# Patient Record
Sex: FEMALE | Race: BLACK OR AFRICAN AMERICAN | HISPANIC OR LATINO
[De-identification: names, ages, dates, MRNs, and addresses within clinical notes are randomized per-mention and may not be internally consistent; named-entity substitution may affect disease eponyms.]

---

## 2021-05-07 ENCOUNTER — APPOINTMENT (OUTPATIENT)
Dept: RHEUMATOLOGY | Facility: CLINIC | Age: 41
End: 2021-05-07
Payer: MEDICAID

## 2021-05-07 VITALS
TEMPERATURE: 97.2 F | HEART RATE: 90 BPM | HEIGHT: 67 IN | DIASTOLIC BLOOD PRESSURE: 79 MMHG | OXYGEN SATURATION: 96 % | SYSTOLIC BLOOD PRESSURE: 121 MMHG | BODY MASS INDEX: 24.96 KG/M2 | WEIGHT: 159 LBS

## 2021-05-07 PROCEDURE — 99203 OFFICE O/P NEW LOW 30 MIN: CPT

## 2021-05-10 ENCOUNTER — NON-APPOINTMENT (OUTPATIENT)
Age: 41
End: 2021-05-10

## 2021-05-10 ENCOUNTER — APPOINTMENT (OUTPATIENT)
Dept: HEART AND VASCULAR | Facility: CLINIC | Age: 41
End: 2021-05-10
Payer: MEDICAID

## 2021-05-10 VITALS
HEIGHT: 67 IN | OXYGEN SATURATION: 98 % | DIASTOLIC BLOOD PRESSURE: 72 MMHG | WEIGHT: 160 LBS | HEART RATE: 83 BPM | SYSTOLIC BLOOD PRESSURE: 110 MMHG | BODY MASS INDEX: 25.11 KG/M2

## 2021-05-10 DIAGNOSIS — Z82.49 FAMILY HISTORY OF ISCHEMIC HEART DISEASE AND OTHER DISEASES OF THE CIRCULATORY SYSTEM: ICD-10-CM

## 2021-05-10 DIAGNOSIS — Z78.9 OTHER SPECIFIED HEALTH STATUS: ICD-10-CM

## 2021-05-10 PROCEDURE — 99203 OFFICE O/P NEW LOW 30 MIN: CPT

## 2021-05-10 PROCEDURE — 93000 ELECTROCARDIOGRAM COMPLETE: CPT

## 2021-05-10 RX ORDER — IRON/IRON ASP GLY/FA/MV-MIN 38 125-25-1MG
TABLET ORAL
Refills: 0 | Status: ACTIVE | COMMUNITY

## 2021-05-10 RX ORDER — MULTIVIT-MIN/IRON/FOLIC ACID/K 18-600-40
CAPSULE ORAL
Refills: 0 | Status: ACTIVE | COMMUNITY

## 2021-05-10 RX ORDER — UBIDECARENONE/VIT E ACET 100MG-5
CAPSULE ORAL
Refills: 0 | Status: ACTIVE | COMMUNITY

## 2021-05-10 RX ORDER — BACILLUS COAGULANS/INULIN 1B-250 MG
CAPSULE ORAL
Refills: 0 | Status: ACTIVE | COMMUNITY

## 2021-05-10 NOTE — REASON FOR VISIT
[FreeTextEntry1] : 40 yo woman with no past medical history who presents today with complaints of intermittent swelling to B/L hands (R> L) and feet when she wakes up. She also feels numbness to UE and pressure across her whole body but mostly from her waist down. Symptoms temporarily relieved by shower or bath. Denies  SOB/THURSTON, palpitations, COMFORT, dizziness and syncope. ETT: SOB with walking but states she walks for at least 12 minutes to 1 hour and THURSTON up to 2 flights of stairs.Diet: Clean eating mostly vegetarian. She does not sleep well 2/2 discomfort of numbness and pressure. Remarks a chest pain on occasion. She was seen in ER for it and a cardiac evaluation was requested\par

## 2021-05-10 NOTE — DISCUSSION/SUMMARY
[FreeTextEntry1] : CP, atypical EKG reviewed, will bring back for echocardiogram provided her insurance company feels that it is a reasonable course of action and deems appropriate to approve.\par

## 2021-05-18 NOTE — PHYSICAL EXAM
[General Appearance - Alert] : alert [General Appearance - In No Acute Distress] : in no acute distress [General Appearance - Well Nourished] : well nourished [General Appearance - Well Developed] : well developed [General Appearance - Well-Appearing] : healthy appearing [Sclera] : the sclera and conjunctiva were normal [Respiration, Rhythm And Depth] : normal respiratory rhythm and effort [Exaggerated Use Of Accessory Muscles For Inspiration] : no accessory muscle use [Edema] : there was no peripheral edema [Nail Clubbing] : no clubbing  or cyanosis of the fingernails [Abnormal Walk] : normal gait [Musculoskeletal - Swelling] : no joint swelling seen [Motor Tone] : muscle strength and tone were normal [] : no rash [Skin Lesions] : no skin lesions [Oriented To Time, Place, And Person] : oriented to person, place, and time [Impaired Insight] : insight and judgment were intact [Affect] : the affect was normal [FreeTextEntry1] : No active synovitis of the upper and lower extremities bilaterally. Feels some resistance with opening hands from handgrip with extension of the fingers.

## 2021-05-18 NOTE — HISTORY OF PRESENT ILLNESS
[FreeTextEntry1] : 41 year old woman referred for rheumatology evaluation\par Feels pinched nerves and stiffness of joints\par Feels symptoms comes and goes\par Symptoms present for at least for the past year\par \par Sometimes feels flexible, other times feels like under a pile of bricks\par \par No regular medications\par Takes vitamin D,C, iron, MVI and probiotic\par vitamins since 2020\par \par No history of covid, had covid test last week\par No vaccine to date\par \par Had MRI brain and cervical spine\par Monday will have follow up with neurology\par Also with cardiology evaluation pending as well. \par \par No symptoms at present, feeling well\par \par Exercises regularly since April 6 at least for 10 minutes to one hour\par But feels sometimes legs swell\par \par Numbness all over\par Will have blood tests on Monday, unclear what was ordered\par \par Went to the emergency at [facility], was seen because of the symptoms\par Swelling hands and feet was feeling pressure in the head, not feeling as though could stretch limbs due to the pressure\par \par Tightness in the hands, feeling like can't be flexible\par Feeling like cannot stretch\par Works as , so feels limiting her ability to work \par \par No rashes\par No alopecia\par No photosensitivity\par No Raynaud's \par No sicca symptoms

## 2021-05-18 NOTE — REVIEW OF SYSTEMS
[Negative] : Heme/Lymph [FreeTextEntry9] : sometimes feels stiffness of the hands, not present at this time

## 2021-05-18 NOTE — ASSESSMENT
[FreeTextEntry1] : 41 year old woman referred following emergency room visit.  Patient with feelings of hand and joint stiffness, not present at this time in addition to paresthesias.  Unclear etiology of symptoms at this time, will check SANDRA with reflex to serologies if positive, in addition to Rheumatoid factor and anti CCP ab. Patient will see cardiology as well in addition to neurology.  further managemnt pending lab results.

## 2021-05-20 ENCOUNTER — APPOINTMENT (OUTPATIENT)
Dept: PULMONOLOGY | Facility: CLINIC | Age: 41
End: 2021-05-20
Payer: MEDICAID

## 2021-05-20 VITALS
WEIGHT: 160 LBS | TEMPERATURE: 98.1 F | DIASTOLIC BLOOD PRESSURE: 78 MMHG | SYSTOLIC BLOOD PRESSURE: 115 MMHG | HEART RATE: 88 BPM | HEIGHT: 67 IN | BODY MASS INDEX: 25.11 KG/M2 | OXYGEN SATURATION: 98 %

## 2021-05-20 PROCEDURE — 99244 OFF/OP CNSLTJ NEW/EST MOD 40: CPT

## 2021-05-21 NOTE — CONSULT LETTER
[Dear  ___] : Dear  [unfilled], [Consult Letter:] : I had the pleasure of evaluating your patient, [unfilled]. [Please see my note below.] : Please see my note below. [Consult Closing:] : Thank you very much for allowing me to participate in the care of this patient.  If you have any questions, please do not hesitate to contact me. [FreeTextEntry3] : Sincerely\par \par Fausto Yang MD [facility]\par , [facility] [employer]\par Associate , Pulmonary and Critical Care Fellowship\par Pulmonary and Critical Care\par [facility]\par Phone: [phone]\par

## 2021-05-21 NOTE — REASON FOR VISIT
[Consultation] : a consultation [Shortness of Breath] : shortness of breath [TextBox_13] : Dr. Treadwell

## 2021-05-21 NOTE — REVIEW OF SYSTEMS
[Fever] : no fever [Chills] : no chills [Dry Eyes] : no dry eyes [Eye Irritation] : no eye irritation [Cough] : no cough [Sputum] : no sputum [Dyspnea] : dyspnea [SOB on Exertion] : sob on exertion [Chest Discomfort] : no chest discomfort [TextBox_151] : rest of ROS negative except in HPI

## 2021-05-21 NOTE — HISTORY OF PRESENT ILLNESS
[TextBox_4] : 41 year old female, never smoker is c/o shortness of breath and inability to take deep breath. Patient is c/o inability to take deep breath from nose. Patient's SOB is present , intermittently, at rest and with exertion. Denies cough, chest pain, pedal edema. Patient has seen cardiologist for current symptoms. Denies history of asthma.

## 2021-05-21 NOTE — DISCUSSION/SUMMARY
[FreeTextEntry1] : 41 year old female, never smoker is c/o shortness of breath and inability to take deep breath.\par \par Review:\par Cardiology note\par Rehumatology note\par \par Plan:\par Nasal turbinate hypertrophy may give patient feeling of inability to breath through nose. Flonase nasal spray was added. Patient has follow up with ENT.\par \par PFT with DLCO\par 6 minute walk test\par CXr ordered today\par Follow up on same day of PFT

## 2021-05-21 NOTE — PHYSICAL EXAM
[No Acute Distress] : no acute distress [Well Nourished] : well nourished [Normal Oropharynx] : normal oropharynx [Low Lying Soft Palate] : low lying soft palate [Erythema] : erythema [Turbinate hypertrophy] : turbinate hypertrophy [Normal Appearance] : normal appearance [No JVD] : no jvd [Normal Rate/Rhythm] : normal rate/rhythm [Normal S1, S2] : normal s1, s2 [No Resp Distress] : no resp distress [Clear to Auscultation Bilaterally] : clear to auscultation bilaterally [Benign] : benign [Not Tender] : not tender [Normal Gait] : normal gait [No Clubbing] : no clubbing [No Edema] : no edema [Normal Color/ Pigmentation] : normal color/ pigmentation [No Rash] : no rash [No Focal Deficits] : no focal deficits [No Sensory Deficits] : no sensory deficits [Oriented x3] : oriented x3 [Normal Affect] : normal affect

## 2021-05-27 ENCOUNTER — NON-APPOINTMENT (OUTPATIENT)
Age: 41
End: 2021-05-27

## 2021-06-01 ENCOUNTER — APPOINTMENT (OUTPATIENT)
Dept: OTOLARYNGOLOGY | Facility: CLINIC | Age: 41
End: 2021-06-01
Payer: MEDICAID

## 2021-06-01 VITALS
DIASTOLIC BLOOD PRESSURE: 78 MMHG | HEIGHT: 67 IN | HEART RATE: 83 BPM | SYSTOLIC BLOOD PRESSURE: 130 MMHG | TEMPERATURE: 97.8 F | OXYGEN SATURATION: 97 % | BODY MASS INDEX: 25.11 KG/M2 | WEIGHT: 160 LBS

## 2021-06-01 PROCEDURE — 31575 DIAGNOSTIC LARYNGOSCOPY: CPT

## 2021-06-01 PROCEDURE — 92557 COMPREHENSIVE HEARING TEST: CPT

## 2021-06-01 PROCEDURE — 92550 TYMPANOMETRY & REFLEX THRESH: CPT

## 2021-06-01 PROCEDURE — 92504 EAR MICROSCOPY EXAMINATION: CPT

## 2021-06-01 NOTE — CONSULT LETTER
[Dear  ___] : Dear  [unfilled], [Consult Letter:] : I had the pleasure of evaluating your patient, [unfilled]. [Please see my note below.] : Please see my note below. [Consult Closing:] : Thank you very much for allowing me to participate in the care of this patient.  If you have any questions, please do not hesitate to contact me. [Sincerely,] : Sincerely, [FreeTextEntry3] : SLY Rooney Jr, MD, FAAOHNS\par Otolaryngologist\par [facility]

## 2021-06-01 NOTE — HISTORY OF PRESENT ILLNESS
[de-identified] : ~ 1 yr of her nose, mouth and ears feel "up" and are "not in divine order" with a sense of fullness in the throat, difficulty breathing, and hearing is a struggle (but denies hearing loss- she has a hard time explaining this). She denies brianne dysphagia/sticking of food but has occasional pain w/ swallowing. She can't feel her throat but feels certain that something is wrong. No hoarseness or unexp. wt gain.  Her eyes & mouth feel dry. Denies reflux. \par Two small lesions are present on the R lower gums for an indeterminate period of time; she has an appt w/ her dentist regarding this. \par Denies tinnitus, ear pain or drainage. No FHx HL. Uses qtips. \par Her nose feels closed and she can't inhale deeply. (+) generalized dyspnea worst in the morning. Feels like her nose looks swollen. Hasn't tried meds for this & denies allergies. She hasn't picked up a rx for flonase per Dr. Yang.

## 2021-06-01 NOTE — PHYSICAL EXAM
[Binocular Microscopic Exam] : Binocular microscopic exam was performed [Normal] : the left middle ear was normal [de-identified] : large, pale ITs bilat [Laryngoscopy Performed] : laryngoscopy was performed, see procedure section for findings [de-identified] : two small R manidib. gingival papillomatous masses

## 2021-06-01 NOTE — ASSESSMENT
[FreeTextEntry1] : Reassured her regarding her hearing; discussed possible APD testing if this seems appropriate to her. \par \par Reviewed her throat complaints which are largely nonspecific; discussed warning signs. Will recheck the BOT at her next visit. \par \par RTC for excision of the two mucosal lesions which are likely to be papillomata. \par \par Encouraged her to try the fluticasone that was prescribed.

## 2021-06-01 NOTE — PROCEDURE
[de-identified] : We discussed the elevated risk of liberation of viral particles from the nasopharynx if the patient were to be asymptomatically infected with COVID-19; after weighing the risks & benefits the decision was made to proceed. The procedure was performed while wearing appropriate PPE and a camera attached to a video system was used to maximize separation from the patient. The scope was handled appropriately. \par Indication: requirement for exam not possible via anterior examination; multiple throat complaints\par After verbal consent and the administration of a small amount of an aerosolized phenylephrine/lidocaine mix, examination was performed with a flexible endoscope placed through the nose. Findings:\par Nasopharynx: unremarkable\par Soft palate, lateral and posterior pharyngeal walls: unremarkable\par Base of tongue & lingual tonsil: minimal minimal asymmetry (R larger)\par Vallecula: unremarkable\par Epiglottis: unremarkable\par Piriform sinuses and pharyngoesophageal junction: unremarkable\par Arytenoids and AE folds: mild interarytenoid edema\par Ventricle and false vocal folds: unremarkable\par True vocal folds: Smooth free edge; surface without ectasias or edema; normal movement bilaterally with good apposition in phonation\par Visible subglottis: unremarkable\par Other findings: none

## 2021-06-01 NOTE — REASON FOR VISIT
[Initial Consultation] : an initial consultation for [FreeTextEntry2] : difficulty breathing & swallowing

## 2021-06-07 ENCOUNTER — APPOINTMENT (OUTPATIENT)
Dept: HEART AND VASCULAR | Facility: CLINIC | Age: 41
End: 2021-06-07

## 2021-06-07 ENCOUNTER — APPOINTMENT (OUTPATIENT)
Dept: RADIOLOGY | Facility: CLINIC | Age: 41
End: 2021-06-07

## 2021-06-07 ENCOUNTER — OUTPATIENT (OUTPATIENT)
Dept: OUTPATIENT SERVICES | Facility: HOSPITAL | Age: 41
LOS: 1 days | End: 2021-06-07
Payer: MEDICAID

## 2021-06-07 ENCOUNTER — APPOINTMENT (OUTPATIENT)
Dept: PULMONOLOGY | Facility: CLINIC | Age: 41
End: 2021-06-07
Payer: MEDICAID

## 2021-06-07 ENCOUNTER — RESULT REVIEW (OUTPATIENT)
Age: 41
End: 2021-06-07

## 2021-06-07 VITALS
HEART RATE: 107 BPM | SYSTOLIC BLOOD PRESSURE: 115 MMHG | DIASTOLIC BLOOD PRESSURE: 68 MMHG | HEIGHT: 67 IN | WEIGHT: 165 LBS | BODY MASS INDEX: 25.9 KG/M2 | OXYGEN SATURATION: 100 % | TEMPERATURE: 97.6 F

## 2021-06-07 PROCEDURE — 71046 X-RAY EXAM CHEST 2 VIEWS: CPT | Mod: 26

## 2021-06-07 PROCEDURE — 94726 PLETHYSMOGRAPHY LUNG VOLUMES: CPT

## 2021-06-07 PROCEDURE — 94729 DIFFUSING CAPACITY: CPT

## 2021-06-07 PROCEDURE — 99214 OFFICE O/P EST MOD 30 MIN: CPT | Mod: 25

## 2021-06-07 PROCEDURE — 94618 PULMONARY STRESS TESTING: CPT

## 2021-06-07 PROCEDURE — ZZZZZ: CPT

## 2021-06-07 PROCEDURE — 94060 EVALUATION OF WHEEZING: CPT

## 2021-06-07 NOTE — HISTORY OF PRESENT ILLNESS
[TextBox_4] : 41 year old female, never smoker is c/o shortness of breath and inability to take deep breath. Patient is c/o inability to take deep breath from nose. Patient's SOB is present , intermittently, at rest and with exertion. Denies cough, chest pain, pedal edema. Patient has seen cardiologist for current symptoms. Denies history of asthma. \par \par 6/7/21:\par PFT was done. Patient did not get CXR and has not started Flonase nasal spray. Patient continues to feel SOB and chest tightness after waking up.

## 2021-06-07 NOTE — DISCUSSION/SUMMARY
[FreeTextEntry1] : 41 year old female, never smoker is c/o shortness of breath and inability to take deep breath.\par \par Review:\par PFT (6 / 21): FEV1 67 , FEV1/FVC 83  , TLC 67   , DLCO 59  , Rev 6 %\par Six minute walk test (6/21): 97% on RA at rest, increased to 100% on RA at rest\par Cardiology note\par Rheumatology note\par \par Plan:\par Shortness of breath:\par - Atypical symptoms for asthma\par - PFT showed restriction\par Plan:\par - Breo 1 puff daily once daily (trial)\par - CXR ordered again\par - May need CT chest (restriction on PFT). Although, restriction on PFT could be explained by truncal obesity (confirmed with patient and she says that she is not pregnant).\par \par Allergic Rhinitis:\par - Flonase nasal spray

## 2021-06-08 RX ORDER — FLUTICASONE FUROATE AND VILANTEROL TRIFENATATE 200; 25 UG/1; UG/1
200-25 POWDER RESPIRATORY (INHALATION)
Qty: 1 | Refills: 2 | Status: DISCONTINUED | COMMUNITY
Start: 2021-06-07 | End: 2021-06-08

## 2021-06-08 RX ORDER — BUDESONIDE AND FORMOTEROL FUMARATE DIHYDRATE 160; 4.5 UG/1; UG/1
160-4.5 AEROSOL RESPIRATORY (INHALATION) TWICE DAILY
Qty: 1 | Refills: 2 | Status: ACTIVE | COMMUNITY
Start: 2021-06-08 | End: 1900-01-01

## 2021-06-11 ENCOUNTER — APPOINTMENT (OUTPATIENT)
Dept: RADIOLOGY | Facility: CLINIC | Age: 41
End: 2021-06-11
Payer: MEDICAID

## 2021-06-11 ENCOUNTER — APPOINTMENT (OUTPATIENT)
Dept: HEART AND VASCULAR | Facility: CLINIC | Age: 41
End: 2021-06-11
Payer: MEDICAID

## 2021-06-11 ENCOUNTER — OUTPATIENT (OUTPATIENT)
Dept: OUTPATIENT SERVICES | Facility: HOSPITAL | Age: 41
LOS: 1 days | End: 2021-06-11

## 2021-06-11 PROCEDURE — 72170 X-RAY EXAM OF PELVIS: CPT | Mod: 26

## 2021-06-11 PROCEDURE — 72100 X-RAY EXAM L-S SPINE 2/3 VWS: CPT | Mod: 26

## 2021-06-11 PROCEDURE — 93306 TTE W/DOPPLER COMPLETE: CPT

## 2021-06-14 ENCOUNTER — APPOINTMENT (OUTPATIENT)
Dept: HEART AND VASCULAR | Facility: CLINIC | Age: 41
End: 2021-06-14
Payer: MEDICAID

## 2021-06-14 ENCOUNTER — APPOINTMENT (OUTPATIENT)
Dept: FAMILY MEDICINE | Facility: CLINIC | Age: 41
End: 2021-06-14
Payer: MEDICAID

## 2021-06-14 VITALS
SYSTOLIC BLOOD PRESSURE: 130 MMHG | DIASTOLIC BLOOD PRESSURE: 89 MMHG | WEIGHT: 167 LBS | OXYGEN SATURATION: 99 % | HEART RATE: 83 BPM | BODY MASS INDEX: 26.84 KG/M2 | HEIGHT: 66 IN | TEMPERATURE: 98.5 F

## 2021-06-14 VITALS — DIASTOLIC BLOOD PRESSURE: 74 MMHG | SYSTOLIC BLOOD PRESSURE: 124 MMHG

## 2021-06-14 DIAGNOSIS — K06.8 OTHER SPECIFIED DISORDERS OF GINGIVA AND EDENTULOUS ALVEOLAR RIDGE: ICD-10-CM

## 2021-06-14 DIAGNOSIS — Z91.89 OTHER SPECIFIED PERSONAL RISK FACTORS, NOT ELSEWHERE CLASSIFIED: ICD-10-CM

## 2021-06-14 DIAGNOSIS — J45.909 UNSPECIFIED ASTHMA, UNCOMPLICATED: ICD-10-CM

## 2021-06-14 DIAGNOSIS — Z87.19 PERSONAL HISTORY OF OTHER DISEASES OF THE DIGESTIVE SYSTEM: ICD-10-CM

## 2021-06-14 DIAGNOSIS — Z00.00 ENCOUNTER FOR GENERAL ADULT MEDICAL EXAMINATION W/OUT ABNORMAL FINDINGS: ICD-10-CM

## 2021-06-14 DIAGNOSIS — R07.89 OTHER CHEST PAIN: ICD-10-CM

## 2021-06-14 DIAGNOSIS — R20.2 PARESTHESIA OF SKIN: ICD-10-CM

## 2021-06-14 DIAGNOSIS — Z87.898 PERSONAL HISTORY OF OTHER SPECIFIED CONDITIONS: ICD-10-CM

## 2021-06-14 DIAGNOSIS — R26.89 OTHER ABNORMALITIES OF GAIT AND MOBILITY: ICD-10-CM

## 2021-06-14 DIAGNOSIS — E53.8 DEFICIENCY OF OTHER SPECIFIED B GROUP VITAMINS: ICD-10-CM

## 2021-06-14 PROCEDURE — G0442 ANNUAL ALCOHOL SCREEN 15 MIN: CPT

## 2021-06-14 PROCEDURE — 99385 PREV VISIT NEW AGE 18-39: CPT | Mod: 25

## 2021-06-14 PROCEDURE — 99442: CPT

## 2021-06-14 PROCEDURE — 99203 OFFICE O/P NEW LOW 30 MIN: CPT | Mod: 25

## 2021-06-14 NOTE — REVIEW OF SYSTEMS
[Recent Change In Weight] : ~T recent weight change [Joint Pain] : joint pain [Confusion] : confusion [Negative] : Heme/Lymph [FreeTextEntry2] : weight gain

## 2021-06-14 NOTE — PLAN
[FreeTextEntry1] : follow up labs, labs drawn in office \par \par reviewed healthcare maintenance lab with patient from neurologist\par pt. to have neuro notes/ scans sent to office \par \par elevated homocysteine \par will follow up b12, folate \par \par impairment of balance \par follow up labs \par \par pos lyme titer, elevated esr \par follow up with ID

## 2021-06-14 NOTE — DATA REVIEWED
[FreeTextEntry1] : reviewed rheum labs, notes \par cardio notes \par patient presented neuro labs to review -- positive lyme titer, low b12

## 2021-06-14 NOTE — HEALTH RISK ASSESSMENT
[Good] : ~his/her~  mood as  good [] : No [Yes] : Yes [Monthly or less (1 pt)] : Monthly or less (1 point) [1 or 2 (0 pts)] : 1 or 2 (0 points) [Never (0 pts)] : Never (0 points) [No] : In the past 12 months have you used drugs other than those required for medical reasons? No [1] : 2) Feeling down, depressed, or hopeless for several days (1) [Audit-CScore] : 1 [de-identified] : walking  [de-identified] : fruits, veggies  [RON6Qmkib] : 2 [HIV test declined] : HIV test declined [Hepatitis C test declined] : Hepatitis C test declined [Change in mental status noted] : No change in mental status noted [None] : None [Language] : denies difficulty with language

## 2021-06-14 NOTE — HISTORY OF PRESENT ILLNESS
[FreeTextEntry1] : establish care  [de-identified] : 42 yo f presents to establish care. \par Last physical was years ago. \par Mentioned weight gain, unable to maintain her weight. \par Feels off balanced. Has followed with neurology, cardio, pulm, rheum. \par Interested in reviewing blood work.

## 2021-06-16 ENCOUNTER — APPOINTMENT (OUTPATIENT)
Dept: PULMONOLOGY | Facility: CLINIC | Age: 41
End: 2021-06-16
Payer: MEDICAID

## 2021-06-16 VITALS
SYSTOLIC BLOOD PRESSURE: 119 MMHG | BODY MASS INDEX: 26.84 KG/M2 | HEART RATE: 98 BPM | OXYGEN SATURATION: 98 % | TEMPERATURE: 97.6 F | WEIGHT: 167 LBS | DIASTOLIC BLOOD PRESSURE: 86 MMHG | HEIGHT: 66 IN

## 2021-06-16 DIAGNOSIS — J30.9 ALLERGIC RHINITIS, UNSPECIFIED: ICD-10-CM

## 2021-06-16 DIAGNOSIS — J34.3 HYPERTROPHY OF NASAL TURBINATES: ICD-10-CM

## 2021-06-16 PROCEDURE — 99214 OFFICE O/P EST MOD 30 MIN: CPT

## 2021-06-16 NOTE — DISCUSSION/SUMMARY
[FreeTextEntry1] : 41 year old female, never smoker is c/o shortness of breath and inability to take deep breath.\par \par Review:\par PFT (6 / 21): FEV1 67 , FEV1/FVC 83  , TLC 67   , DLCO 59  , Rev 6 %\par Six minute walk test (6/21): 97% on RA at rest, increased to 100% on RA at rest\par Cardiology note\par Rheumatology note\par \par Plan:\par Shortness of breath:\par - Atypical symptoms for asthma\par - PFT showed restriction but is likely due to truncal obesity\par -CXR normal\par -no improvement with symbicort, symptoms likely related to nasal turbinate hypertrophy and not due to primary lung pathology\par Plan:\par - encouraged patient to use fluticasone nasal spray and follow up with ENT\par \par - Encouraged patient to follow up with PCP and see a psychiatrist regarding symptoms of hearing voices and attacked by demons. Will notify PCP of reported symptoms.

## 2021-06-16 NOTE — HISTORY OF PRESENT ILLNESS
[TextBox_4] : 41 year old female, never smoker is c/o shortness of breath and inability to take deep breath. Patient is c/o inability to take deep breath from nose. Patient's SOB is present , intermittently, at rest and with exertion. Denies cough, chest pain, pedal edema. Patient has seen cardiologist for current symptoms. Denies history of asthma. \par \par 6/7/21:\par PFT was done. Patient did not get CXR and has not started Flonase nasal spray. Patient continues to feel SOB and chest tightness after waking up. \par \par 6/16/21:\par Patient states she has been complaint with symbicort for the past week without noticing any difference. She states she has not yet started using fluticasone. She has seen ENT who also encouraged fluticasone and is going to follow up with them for a biopsy. Patient also reports hearing voices and feeling like she is being attacked by demons. States she has been hospitalized for this before but is not currently taking any psychiatric medications or seeing a psychiatrist.

## 2021-06-16 NOTE — REVIEW OF SYSTEMS
[Dyspnea] : dyspnea [SOB on Exertion] : sob on exertion [Fever] : no fever [Chills] : no chills [Dry Eyes] : no dry eyes [Eye Irritation] : no eye irritation [Cough] : no cough [Chest Discomfort] : no chest discomfort [Sputum] : no sputum [TextBox_151] : All other ROS negative except in HPI

## 2021-06-17 ENCOUNTER — NON-APPOINTMENT (OUTPATIENT)
Age: 41
End: 2021-06-17

## 2021-06-17 ENCOUNTER — APPOINTMENT (OUTPATIENT)
Dept: INFECTIOUS DISEASE | Facility: CLINIC | Age: 41
End: 2021-06-17
Payer: MEDICAID

## 2021-06-17 VITALS
WEIGHT: 165 LBS | BODY MASS INDEX: 26.52 KG/M2 | TEMPERATURE: 97.8 F | HEIGHT: 66 IN | DIASTOLIC BLOOD PRESSURE: 84 MMHG | OXYGEN SATURATION: 97 % | SYSTOLIC BLOOD PRESSURE: 118 MMHG | HEART RATE: 105 BPM

## 2021-06-17 DIAGNOSIS — D25.9 LEIOMYOMA OF UTERUS, UNSPECIFIED: ICD-10-CM

## 2021-06-17 PROCEDURE — 99205 OFFICE O/P NEW HI 60 MIN: CPT

## 2021-06-18 PROBLEM — D25.9 FIBROID, UTERINE: Status: ACTIVE | Noted: 2021-06-18

## 2021-06-18 NOTE — REASON FOR VISIT
[Initial Evaluation] : an initial evaluation [FreeTextEntry1] : joint swelling, rule out lyme disease

## 2021-06-18 NOTE — PHYSICAL EXAM
[General Appearance - Alert] : alert [General Appearance - In No Acute Distress] : in no acute distress [General Appearance - Well Nourished] : well nourished [Sclera] : the sclera and conjunctiva were normal [Outer Ear] : the ears and nose were normal in appearance [Oropharynx] : the oropharynx was normal with no thrush [Neck Appearance] : the appearance of the neck was normal [] : no respiratory distress [Respiration, Rhythm And Depth] : normal respiratory rhythm and effort [Auscultation Breath Sounds / Voice Sounds] : lungs were clear to auscultation bilaterally [Heart Rate And Rhythm] : heart rate was normal and rhythm regular [Heart Sounds] : normal S1 and S2 [Bowel Sounds] : normal bowel sounds [Abdomen Tenderness] : non-tender [FreeTextEntry1] : large mass palpated - per patient, known fibroid

## 2021-06-18 NOTE — HISTORY OF PRESENT ILLNESS
[FreeTextEntry1] : 41F no significant PMH p/w multiple joint stiffness and rule out lyme disease. \par \par Patient has multiple nonspecific complaints and was seen by multiple specialities.  She reports that she has been having b/l hands and feet stiffness/swelling, and pinched nerve feeling on her face and hip, and legs intermittently for several years.  She also feels back of her throat is "closed".  She always feel cold.  Denied fever/chills, n/v/d, abdominal pain, cough, dysuria, urinary frequency.  Patient was initially seen by neurologist in April for b/l hands and feet swelling and feeling "off balance".   She was told to go to see different specialists.   She was seen by rheumatologist for joint issue and work-up was negative.   Reports chest tightness and SOB and was evaluated by pulm and card - was told that she doesn't have any cardiac or pulm problems.  Also seen by ENT - unremarkable exam and she was reassured.   She has two lesions on R buccal mucosa since 2019 and she is scheduled to have biopsy tomorrow.  She was seen by new PMD Dr. Perry on 6/14, and was referred to ID to see if her joint issue is due to lyme disease.  Patient brought negative lyme disease test result.\par Of note, patient also said she has fibroid and was seen by GYN last year, and was told she need fibroid removal.  \par Denied outdoor activities.  Had recent mosquito bites. \par  [Sexually Active] : The patient is not sexually active [de-identified] : not active for 7 years [de-identified] : Visited [place] recently.  Born in [place], grew up in [place], moved to [place] in 2014 [de-identified] : works for [employer], remote work  [de-identified] : none [de-identified] : alone, no pets

## 2021-06-18 NOTE — ASSESSMENT
[FreeTextEntry1] : 41F no significant PMH p/w multiple joint stiffness and rule out lyme disease.  Patient has non-specific symptoms for several years, and her lyme antibody test was negative (only screening test positive, and IgG band only 1/10 positive, c/w negative result).  She does not have lyme disease.   ESR elevation is only mild, and it is a non specific test.  Some people may have elevated ESR as baseline.   I do not think her presentation is c/f any infection.  I reassured her and told her to discuss with her PMD.  \par \par f/u PMD\par f/u ENT for biopsy of R buccal mucosa\par f/u GYN for large fibroid \par \par f/u PRN

## 2021-06-18 NOTE — DATA REVIEWED
[FreeTextEntry1] : 5/20/21\par ESR 31\par CMP, CBC all wnl\par TSH wnl\par Lyme ab screen positive, IgM bands all neg, IgG band only 1/10 positive

## 2021-06-24 ENCOUNTER — APPOINTMENT (OUTPATIENT)
Dept: FAMILY MEDICINE | Facility: CLINIC | Age: 41
End: 2021-06-24
Payer: MEDICAID

## 2021-06-24 DIAGNOSIS — R44.0 AUDITORY HALLUCINATIONS: ICD-10-CM

## 2021-06-24 DIAGNOSIS — R70.0 ELEVATED ERYTHROCYTE SEDIMENTATION RATE: ICD-10-CM

## 2021-06-24 DIAGNOSIS — M25.60 STIFFNESS OF UNSPECIFIED JOINT, NOT ELSEWHERE CLASSIFIED: ICD-10-CM

## 2021-06-24 PROCEDURE — 99213 OFFICE O/P EST LOW 20 MIN: CPT | Mod: 95

## 2021-06-28 PROBLEM — R44.0 HEARING VOICES: Status: ACTIVE | Noted: 2021-06-28

## 2021-06-28 PROBLEM — M25.60 STIFFNESS IN JOINT: Status: ACTIVE | Noted: 2021-05-07

## 2021-06-28 PROBLEM — R70.0 ELEVATED ERYTHROCYTE SEDIMENTATION RATE: Status: ACTIVE | Noted: 2021-06-14

## 2021-06-28 NOTE — HISTORY OF PRESENT ILLNESS
[Home] : at home, [unfilled] , at the time of the visit. [Medical Office: ([facility])___] : at the medical office located in  [Verbal consent obtained from patient] : the patient, [unfilled] [FreeTextEntry8] : cc: hearing voices \par \par 40 yo f presents to discuss voices. \par Was initially seen at [facility] where she mentioned this. \par When asked, patient admitted to hearing voices. \par Have never told her to harm herself or anyone else. \par Voices annoy patient, mentioned when meds controlled does not hear them. \par Stopped her med regimen last August (8/20) on her own. \par Has not follow up with psych, has not considered it. \par \par Interested in lab work.

## 2021-06-28 NOTE — PLAN
[FreeTextEntry1] : encouraged reaching out to psych and letting them know she hears voices/ has not been on her meds\par mentioned she will connect with her prior psychiatrist \par \par discussed importance of routine follow up, healthy lifestyles, routine bloodwork \par will connect with the office when she is ready for bloodwork

## 2021-06-28 NOTE — REVIEW OF SYSTEMS
[Suicidal] : not suicidal [Insomnia] : no insomnia [Anxiety] : no anxiety [Depression] : no depression [Negative] : Heme/Lymph [de-identified] : admits hearing voices in her head

## 2021-07-01 ENCOUNTER — APPOINTMENT (OUTPATIENT)
Dept: PULMONOLOGY | Facility: CLINIC | Age: 41
End: 2021-07-01
Payer: MEDICAID

## 2021-07-01 VITALS
WEIGHT: 170 LBS | SYSTOLIC BLOOD PRESSURE: 121 MMHG | HEIGHT: 66 IN | BODY MASS INDEX: 27.32 KG/M2 | DIASTOLIC BLOOD PRESSURE: 82 MMHG | OXYGEN SATURATION: 98 % | HEART RATE: 94 BPM | TEMPERATURE: 97.6 F

## 2021-07-01 DIAGNOSIS — F20.9 SCHIZOPHRENIA, UNSPECIFIED: ICD-10-CM

## 2021-07-01 DIAGNOSIS — F51.04 PSYCHOPHYSIOLOGIC INSOMNIA: ICD-10-CM

## 2021-07-01 PROCEDURE — 99203 OFFICE O/P NEW LOW 30 MIN: CPT

## 2021-07-01 NOTE — PHYSICAL EXAM
[General Appearance - In No Acute Distress] : no acute distress [Normal Oropharynx] : normal oropharynx [Neck Appearance] : the appearance of the neck was normal [Neck Cervical Mass (___cm)] : no neck mass was observed [Jugular Venous Distention Increased] : there was no jugular-venous distention [Thyroid Diffuse Enlargement] : the thyroid was not enlarged [Thyroid Nodule] : there were no palpable thyroid nodules [Heart Rate And Rhythm] : heart rate was normal and rhythm regular [Heart Sounds] : normal S1 and S2 [Heart Sounds Gallop] : no gallops [Murmurs] : no murmurs [Heart Sounds Pericardial Friction Rub] : no pericardial rub [Auscultation Breath Sounds / Voice Sounds] : lungs were clear to auscultation bilaterally [Involuntary Movements] : no involuntary movements were seen [No Focal Deficits] : no focal deficits [Oriented To Time, Place, And Person] : oriented to person, place, and time [Impaired Insight] : insight and judgment were intact [Affect] : the affect was normal [Nail Clubbing] : no clubbing of the fingernails [Cyanosis, Localized] : no localized cyanosis [Petechial Hemorrhages (___cm)] : no petechial hemorrhages [] : no ischemic changes

## 2021-07-01 NOTE — REVIEW OF SYSTEMS
[EDS: ESS=____] : daytime somnolence: ESS=[unfilled] [Fatigue] : fatigue [Snoring] : snoring [Negative] : Musculoskeletal [Difficulty Initiating Sleep] : difficulty falling asleep [Difficulty Maintaining Sleep] : difficulty maintaining sleep [Chronic Insomnia] : chronic  insomnia [de-identified] : See HPI

## 2021-07-01 NOTE — ASSESSMENT
[FreeTextEntry1] : 40 y/o F with psychosis and schizophrenia x 7 years who is here for chronic insomnia. She stopped her Psych medication on her own, sleep worse since.  Does have snoring, no real excessive daytime somnolence.  Suspect all related to psychiatric disease, not under psych care now.  Urged to have psych eval, not willing.  Will have overnight polysomnography

## 2021-07-01 NOTE — HISTORY OF PRESENT ILLNESS
[FreeTextEntry1] : 07/01/2021 :  FLORENCIO MARTINEZ is a 41 year female with PMHx psychosis  and schizophrenia who is here for initial evaluation of insomnia. \par She states that has been hospitalized at [facility] in October 2019 and was placed on Seroquel. Although, she stopped the medication on her own last August of 2020. She c/o insomnia x 5 years. \par \par She describes that her "body is being tapped and being cursed" and "holding her against her will". She is originally from [facility] [facility] and is planning to go back to [place] in mid July. She feels tired and sleepy during a day but can not nap. She took melatonin in the past which did not help her. \par \par Sleep Routine:\par \par She goes to bed at 12A, sleep latency is about 30 min, she wakes up several times, WASO varies, and then she is up at 9A. She does not nap . Her ESS is 2/24\par \par \par She denies hurting self or others, cataplexy, RLS, parasomnia, or any other sleep behavioral issues. \par \par 
Pt with C/O N/V abdomen pain on and off from Thursday .

## 2021-07-02 ENCOUNTER — APPOINTMENT (OUTPATIENT)
Dept: OTOLARYNGOLOGY | Facility: CLINIC | Age: 41
End: 2021-07-02

## 2021-07-08 ENCOUNTER — OUTPATIENT (OUTPATIENT)
Dept: OUTPATIENT SERVICES | Facility: HOSPITAL | Age: 41
LOS: 1 days | End: 2021-07-08
Payer: MEDICAID

## 2021-07-08 ENCOUNTER — APPOINTMENT (OUTPATIENT)
Dept: SLEEP CENTER | Facility: HOSPITAL | Age: 41
End: 2021-07-08

## 2021-07-08 DIAGNOSIS — G47.33 OBSTRUCTIVE SLEEP APNEA (ADULT) (PEDIATRIC): ICD-10-CM

## 2021-07-08 PROCEDURE — 95810 POLYSOM 6/> YRS 4/> PARAM: CPT | Mod: 26

## 2021-07-08 PROCEDURE — 95810 POLYSOM 6/> YRS 4/> PARAM: CPT

## 2021-07-12 ENCOUNTER — APPOINTMENT (OUTPATIENT)
Dept: GASTROENTEROLOGY | Facility: CLINIC | Age: 41
End: 2021-07-12
Payer: MEDICAID

## 2021-07-12 VITALS
BODY MASS INDEX: 27.32 KG/M2 | WEIGHT: 170 LBS | HEART RATE: 90 BPM | TEMPERATURE: 98 F | OXYGEN SATURATION: 98 % | DIASTOLIC BLOOD PRESSURE: 71 MMHG | SYSTOLIC BLOOD PRESSURE: 103 MMHG | HEIGHT: 66 IN

## 2021-07-12 DIAGNOSIS — R19.4 CHANGE IN BOWEL HABIT: ICD-10-CM

## 2021-07-12 PROCEDURE — 99204 OFFICE O/P NEW MOD 45 MIN: CPT

## 2021-08-09 ENCOUNTER — RX RENEWAL (OUTPATIENT)
Age: 41
End: 2021-08-09

## 2021-08-09 RX ORDER — FLUTICASONE PROPIONATE 50 UG/1
50 SPRAY, METERED NASAL TWICE DAILY
Qty: 1 | Refills: 2 | Status: ACTIVE | COMMUNITY
Start: 2021-05-21 | End: 1900-01-01

## 2021-08-20 ENCOUNTER — APPOINTMENT (OUTPATIENT)
Dept: RHEUMATOLOGY | Facility: CLINIC | Age: 41
End: 2021-08-20

## 2022-06-20 ENCOUNTER — APPOINTMENT (OUTPATIENT)
Dept: FAMILY MEDICINE | Facility: CLINIC | Age: 42
End: 2022-06-20

## 2022-06-27 ENCOUNTER — APPOINTMENT (OUTPATIENT)
Dept: FAMILY MEDICINE | Facility: CLINIC | Age: 42
End: 2022-06-27

## 2022-08-04 ENCOUNTER — APPOINTMENT (OUTPATIENT)
Dept: FAMILY MEDICINE | Facility: CLINIC | Age: 42
End: 2022-08-04

## 2022-10-26 NOTE — CONSULT LETTER
No paperwork has been received for patient.    [Dear  ___] : Dear  [unfilled], [Consult Letter:] : I had the pleasure of evaluating your patient, [unfilled]. [Please see my note below.] : Please see my note below. [Consult Closing:] : Thank you very much for allowing me to participate in the care of this patient.  If you have any questions, please do not hesitate to contact me. [FreeTextEntry3] : Sincerely\par \par Fausto Yang MD [facility]\par , [employer]\par Associate , Pulmonary and Critical Care Fellowship\par Pulmonary and Critical Care\par [facility]\par Phone: [phone]\par  DISPLAY PLAN FREE TEXT